# Patient Record
Sex: FEMALE | Race: WHITE | NOT HISPANIC OR LATINO | Employment: OTHER | URBAN - METROPOLITAN AREA
[De-identification: names, ages, dates, MRNs, and addresses within clinical notes are randomized per-mention and may not be internally consistent; named-entity substitution may affect disease eponyms.]

---

## 2017-09-11 ENCOUNTER — PATIENT MESSAGE (OUTPATIENT)
Dept: OBSTETRICS AND GYNECOLOGY | Facility: CLINIC | Age: 57
End: 2017-09-11

## 2017-09-11 DIAGNOSIS — Z12.31 ENCOUNTER FOR SCREENING MAMMOGRAM FOR BREAST CANCER: Primary | ICD-10-CM

## 2017-10-19 ENCOUNTER — OFFICE VISIT (OUTPATIENT)
Dept: OBSTETRICS AND GYNECOLOGY | Facility: CLINIC | Age: 57
End: 2017-10-19
Payer: COMMERCIAL

## 2017-10-19 VITALS
BODY MASS INDEX: 35.93 KG/M2 | HEART RATE: 67 BPM | HEIGHT: 63 IN | SYSTOLIC BLOOD PRESSURE: 147 MMHG | DIASTOLIC BLOOD PRESSURE: 82 MMHG | WEIGHT: 202.81 LBS

## 2017-10-19 DIAGNOSIS — Z01.419 ENCOUNTER FOR WELL WOMAN EXAM WITH ROUTINE GYNECOLOGICAL EXAM: Primary | ICD-10-CM

## 2017-10-19 PROCEDURE — 88175 CYTOPATH C/V AUTO FLUID REDO: CPT

## 2017-10-19 PROCEDURE — 99999 PR PBB SHADOW E&M-EST. PATIENT-LVL III: CPT | Mod: PBBFAC,,, | Performed by: OBSTETRICS & GYNECOLOGY

## 2017-10-19 PROCEDURE — 99396 PREV VISIT EST AGE 40-64: CPT | Mod: S$GLB,,, | Performed by: OBSTETRICS & GYNECOLOGY

## 2017-10-19 RX ORDER — VALACYCLOVIR HYDROCHLORIDE 500 MG/1
500 TABLET, FILM COATED ORAL DAILY
Qty: 90 TABLET | Refills: 3 | Status: SHIPPED | OUTPATIENT
Start: 2017-10-19 | End: 2019-10-23

## 2017-10-19 RX ORDER — VALACYCLOVIR HYDROCHLORIDE 500 MG/1
500 TABLET, FILM COATED ORAL DAILY
COMMUNITY
End: 2017-10-19 | Stop reason: SDUPTHER

## 2017-10-19 RX ORDER — RAMIPRIL 10 MG/1
10 CAPSULE ORAL 2 TIMES DAILY
COMMUNITY
Start: 2017-07-11 | End: 2019-03-04

## 2017-10-27 NOTE — PROGRESS NOTES
SUBJECTIVE:   57 y.o. female   for annual routine Pap and checkup. Patient's last menstrual period was 2005..  She has no unusual complaints.        Past Medical History:   Diagnosis Date    Anemia     Hypertension      Past Surgical History:   Procedure Laterality Date     SECTION      x 2     Social History     Social History    Marital status:      Spouse name: N/A    Number of children: N/A    Years of education: N/A     Occupational History    Not on file.     Social History Main Topics    Smoking status: Never Smoker    Smokeless tobacco: Not on file    Alcohol use No    Drug use: No    Sexual activity: Yes     Partners: Male     Birth control/ protection: Post-menopausal     Other Topics Concern    Not on file     Social History Narrative    No narrative on file     Family History   Problem Relation Age of Onset    Cancer Mother      lung    Breast cancer Neg Hx     Colon cancer Neg Hx     Ovarian cancer Neg Hx     Diabetes Neg Hx     Hypertension Neg Hx     Stroke Neg Hx      OB History    Para Term  AB Living   2 2       2   SAB TAB Ectopic Multiple Live Births                  # Outcome Date GA Lbr Ernesto/2nd Weight Sex Delivery Anes PTL Lv   2 Para      CS-LTranv      1 Para      CS-LTranv               Current Outpatient Prescriptions   Medication Sig Dispense Refill    ramipril (ALTACE) 10 MG capsule Take 10 mg by mouth 2 (two) times daily.      valacyclovir (VALTREX) 500 MG tablet Take 1 tablet (500 mg total) by mouth once daily. 90 tablet 3    omeprazole (PRILOSEC) 10 MG capsule Take 10 mg by mouth once daily.       No current facility-administered medications for this visit.      Allergies: Patient has no known allergies.     ROS:  Constitutional: no weight loss, weight gain, fever, fatigue  Eyes:  No vision changes, glasses/contacts  ENT/Mouth: No ulcers, sinus problems, ears ringing, headache  Cardiovascular: No inability to lie flat,  "chest pain, exercise intolerance, swelling, heart palpitations  Respiratory: No wheezing, coughing blood, shortness of breath, or cough  Gastrointestinal: No diarrhea, bloody stool, nausea/vomiting, constipation, gas, hemorrhoids  Genitourinary: No blood in urine, painful urination, urgency of urination, frequency of urination, incomplete emptying, incontinence, abnormal bleeding, painful periods, heavy periods, vaginal discharge, vaginal odor, painful intercourse, sexual problems, bleeding after intercourse.  Musculoskeletal: No muscle weakness  Skin/Breast: No painful breasts, nipple discharge, masses, rash, ulcers  Neurological: No passing out, seizures, numbness, headache  Endocrine: No diabetes, hypothyroid, hyperthyroid, hot flashes, hair loss, abnormal hair growth, ance  Psychiatric: No depression, crying  Hematologic: No bruises, bleeding, swollen lymph nodes, anemia.      OBJECTIVE:   The patient appears well, alert, oriented x 3, in no distress.  BP (!) 147/82   Pulse 67   Ht 5' 3" (1.6 m)   Wt 92 kg (202 lb 13.2 oz)   LMP 12/01/2005   BMI 35.93 kg/m²   NECK: no thyromegaly, trachea midline  SKIN: no acne, striae, hirsutism  BREAST EXAM: breasts appear normal, no suspicious masses, no skin or nipple changes or axillary nodes  ABDOMEN: no hernias, masses, or hepatosplenomegaly  GENITALIA: normal external genitalia, no erythema, no discharge  URETHRA: normal urethra, normal urethral meatus  VAGINA: mucosal atrophy  CERVIX: no  cervical motion tenderness,  UTERUS: normal size, contour, position, consistency, mobility, non-tender  ADNEXA: normal adnexa and no mass, fullness, tenderness      ASSESSMENT:   well woman      PLAN:   mammogram  pap smear  counseled on breast self exam and mammography screening  return annually or prn  "

## 2019-03-01 ENCOUNTER — TELEPHONE (OUTPATIENT)
Dept: PODIATRY | Facility: CLINIC | Age: 59
End: 2019-03-01

## 2019-03-01 NOTE — TELEPHONE ENCOUNTER
----- Message from Emili Duval MA sent at 3/1/2019 10:42 AM CST -----      ----- Message -----  From: Joycelyn Damon  Sent: 3/1/2019   9:17 AM  To: [facility] Staff    Referral from Dr Ramirez

## 2019-03-04 ENCOUNTER — OFFICE VISIT (OUTPATIENT)
Dept: PODIATRY | Facility: CLINIC | Age: 59
End: 2019-03-04
Payer: COMMERCIAL

## 2019-03-04 VITALS
BODY MASS INDEX: 35.78 KG/M2 | SYSTOLIC BLOOD PRESSURE: 142 MMHG | TEMPERATURE: 99 F | DIASTOLIC BLOOD PRESSURE: 82 MMHG | HEART RATE: 87 BPM | WEIGHT: 202 LBS

## 2019-03-04 DIAGNOSIS — M76.829 POSTERIOR TIBIAL TENDON DYSFUNCTION: Primary | ICD-10-CM

## 2019-03-04 DIAGNOSIS — M79.672 LEFT FOOT PAIN: ICD-10-CM

## 2019-03-04 PROCEDURE — 99203 OFFICE O/P NEW LOW 30 MIN: CPT | Mod: ,,, | Performed by: PODIATRIST

## 2019-03-04 PROCEDURE — 99203 PR OFFICE/OUTPT VISIT, NEW, LEVL III, 30-44 MIN: ICD-10-PCS | Mod: ,,, | Performed by: PODIATRIST

## 2019-03-04 PROCEDURE — 3008F PR BODY MASS INDEX (BMI) DOCUMENTED: ICD-10-PCS | Mod: ,,, | Performed by: PODIATRIST

## 2019-03-04 PROCEDURE — 3008F BODY MASS INDEX DOCD: CPT | Mod: ,,, | Performed by: PODIATRIST

## 2019-03-04 RX ORDER — MELOXICAM 15 MG/1
15 TABLET ORAL
COMMUNITY
Start: 2019-02-18 | End: 2019-09-16

## 2019-03-04 NOTE — PATIENT INSTRUCTIONS
Understanding Posterior Tibialis Tenosynovitis    The posterior tibialis tendon runs along the inside of the foot. It connects the calf muscle (posterior tibialis muscle) to bones on the inside of the foot. It helps maintain the arch of the foot. It also gives you stability when you move. Posterior tibialis tenosynovitis is when this tendon becomes inflamed or torn.     How to say it  [id number]   What causes posterior tibialis tenosynovitis?  This condition is often caused by an injury to the tendon. For instance, a fall can tear the tendon. Overuse can also damage it. People who play sports like basketball or tennis a lot may weaken the tendon over time.  Symptoms of posterior tibialis tenosynovitis  The symptoms of this condition include pain and swelling. The pain is usually felt near the tendon, on the inside of the foot and ankle. It often gets worse over time or with an increase in activity. Your arch may eventually fall, leading to a flat foot. Your foot may also start to turn outward.  Treatment for posterior tibialis tenosynovitis  Treatment for this condition depends on the severity of the tear. Treatment may include:  · Rest. You should avoid any activities that cause pain and swelling. You may also need to limit the amount of weight you put on your injured foot.  · Cold packs. Putting a cold pack on the tendon may reduce pain and swelling.  · Medicine. Over-the-counter pain medicines can reduce pain and swelling.  · Leg cast or walking boot. Severe tears of the posterior tibialis tendon may need to be kept from moving. These devices can help protect the tendon and reduce swelling.  · Shoe insert or brace. Like a leg cast or walking boot, these devices can help protect the tendon as it heals. They may also ease pain.  · Strengthening and stretching exercises. Certain exercises can help you regain strength and flexibility in your foot..  · Surgery. Several surgical choices are  available to fix the torn tendon or replace it. But you often do not need surgery unless your symptoms do not get better after trying other treatments for at least 6 months.     When to call your healthcare provider  Call your healthcare provider right away if you have any of these:  · Fever of 100.4°F (38°C) or higher, or as directed  · Pain that gets worse  · Symptoms that dont get better, or get worse  · New symptoms    Date Last Reviewed: 3/10/2016  © 5083-1028 [employer]. [address] [place] [address], [place] [address]. All rights reserved. This information is not intended as a substitute for professional medical care. Always follow your healthcare professional's instructions.

## 2019-03-04 NOTE — PROGRESS NOTES
[address] [facility] [address]  [place] [address]  Phone: [phone]   [phone]    Patient's PCP:Arnav Ramirez Iv, MD  Referring Provider: No ref. provider found    Subjective:      Chief Complaint:: Foot Pain (left) and Heel Pain (left)    NANDA Crane is a 58 y.o. female who presents with a complaint of left foot pain hurting inside of foot and ball of foot lasting for several weeks. Onset of the symptoms was rolled it thanks week but took advil and icepack which helped it and pain was gone. 2019 pain started coming back and is getting worse.  Current symptoms include burning around ankle, sandee horse/cramp on ball of toes, rip/tear in arch.  Aggravating factors are none, pain comes and goes. Symptoms have stayed the same. Treatment to date have included ace bandage and advil, mobic from Dr. Ramirez which gave relief.  Patients rates pain 4/10 on pain scale.  Pt has MRI from primary care doctor.      Vitals:    19 1525   BP: (!) 142/82   Pulse: 87   Temp: 99.2 °F (37.3 °C)     Shoe Size: 8    Past Surgical History:   Procedure Laterality Date     SECTION      x 2     Past Medical History:   Diagnosis Date    Anemia     Hypertension      Family History   Problem Relation Age of Onset    Cancer Mother         lung    Breast cancer Neg Hx     Colon cancer Neg Hx     Ovarian cancer Neg Hx     Diabetes Neg Hx     Hypertension Neg Hx     Stroke Neg Hx         Social History:   Marital Status:   Alcohol History:  reports that she does not drink alcohol.  Tobacco History:  reports that  has never smoked. She does not have any smokeless tobacco history on file.  Drug History:  reports that she does not use drugs.    Review of patient's allergies indicates:  No Known Allergies    Current Outpatient Medications   Medication Sig Dispense Refill    meloxicam (MOBIC) 15 MG tablet Take 15 mg by mouth.      ramipril (ALTACE) 10 MG capsule Take 10 mg by mouth 2  (two) times daily.      omeprazole (PRILOSEC) 10 MG capsule Take 10 mg by mouth once daily.      valacyclovir (VALTREX) 500 MG tablet Take 1 tablet (500 mg total) by mouth once daily. 90 tablet 3     No current facility-administered medications for this visit.        Review of Systems   Constitutional: Negative for chills, fatigue, fever and unexpected weight change.   HENT: Negative for hearing loss and trouble swallowing.    Eyes: Negative for photophobia and visual disturbance.   Respiratory: Negative for cough, shortness of breath and wheezing.    Cardiovascular: Negative for chest pain, palpitations and leg swelling.   Gastrointestinal: Negative for abdominal pain and nausea.   Genitourinary: Negative for dysuria and frequency.   Musculoskeletal: Negative for arthralgias, back pain and joint swelling.   Skin: Negative for rash.   Neurological: Negative for tremors, seizures, weakness, numbness and headaches.   Hematological: Does not bruise/bleed easily.         Objective:        Physical Exam:   Foot Exam    General  General Appearance: appears stated age and healthy   Orientation: alert and oriented to person, place, and time   Affect: appropriate   Gait: unimpaired       Left Foot/Ankle      Inspection and Palpation  Ecchymosis: none  Tenderness: (Tibialis posterior tendon distal to the medial malleolus)  Swelling: (Mild edema along the distal tibialis posterior tendon)  Arch: pes planus  Hammertoes: absent  Hallux valgus: no  Hallux limitus: no  Skin Exam: skin intact;     Neurovascular  Dorsalis pedis: 2+  Posterior tibial: 2+  Saphenous nerve sensation: normal  Tibial nerve sensation: normal  Superficial peroneal nerve sensation: normal  Deep peroneal nerve sensation: normal  Sural nerve sensation: normal    Muscle Strength  Ankle dorsiflexion: 5  Ankle plantar flexion: 5  Ankle inversion: 5  Ankle eversion: 5  Great toe extension: 5  Great toe flexion: 5    Range of Motion    Passive  Ankle eversion:  pain  Ankle inversion: pain      Tests  PT Tinel's sign: negative  Heel raise: pain   Comments  There is tenderness to palpation along the distal course of the tibialis posterior mostly between the medial malleolus and its insertion the navicular. There is mild tenderness along the arch. No no lumps or voids are palpated.    Physical Exam   Cardiovascular:   Pulses:       Dorsalis pedis pulses are 2+ on the left side.        Posterior tibial pulses are 2+ on the left side.       Imaging: MRI of the left ankle without contrast from outside source shows diffuse enlargement in heterogeneous intermediate T2 signal intensity within the posterior tibialis tendon at the retromalleolar groove extending to the tarsal Tylenol. The remaining medial flexor tendons are intact. There is soft tissue edema noted surrounding the posterior tibialis tendon at and near the tarsal tunnel. There is suspicion for small multipartite os naviculare. Minor marrow edema signal intensity is suspected involving components of the suspected multipartite os naviculare. Spring ligament is intact. The cervical and interosseous sinus tarsi ligaments are intact. The bifurcate ligament is intact.    Thickening of the proximal 3.6 cm of the central cord of the plantar aponeurosis as well as the most proximal medial cord component for the plantar aponeurosis. No evidence for the clinical diagnosis of active plantar fasciitis.           Assessment:       1. Posterior tibial tendon dysfunction - Left Foot    2. Left foot pain - Left Foot      Plan:   Posterior tibial tendon dysfunction - Left Foot    Left foot pain - Left Foot      Follow-up if symptoms worsen or fail to improve.    Procedures - None    Recommend running shoe with OTC arch support full time  Ice to painful area 15 minutes at a time  mobic as needed  No heavy impact  Avoid squatting and stooping as well as raising on the ball the foot      Counseling:     I provided patient education  verbally regarding:   Patient diagnosis, treatment options, as well as alternatives, risks, and benefits.     posterior tibial tendon dysfuncton stage 1, 2, 3 ,4 and the long term treatment of each stage and the possible complications of each stage.  I explained that if symptoms are not improving that patient may need cam walker boot for further mobilization of the foot.    This note was created using Dragon voice recognition software that occasionally misinterpreted phrases or words.

## 2019-09-16 ENCOUNTER — OFFICE VISIT (OUTPATIENT)
Dept: PODIATRY | Facility: CLINIC | Age: 59
End: 2019-09-16
Payer: COMMERCIAL

## 2019-09-16 VITALS
WEIGHT: 202 LBS | HEART RATE: 76 BPM | SYSTOLIC BLOOD PRESSURE: 134 MMHG | HEIGHT: 63 IN | BODY MASS INDEX: 35.79 KG/M2 | DIASTOLIC BLOOD PRESSURE: 84 MMHG

## 2019-09-16 DIAGNOSIS — M76.829 POSTERIOR TIBIAL TENDON DYSFUNCTION: Primary | ICD-10-CM

## 2019-09-16 DIAGNOSIS — M79.672 LEFT FOOT PAIN: ICD-10-CM

## 2019-09-16 DIAGNOSIS — G89.29 CHRONIC PAIN OF LEFT ANKLE: ICD-10-CM

## 2019-09-16 DIAGNOSIS — M25.572 CHRONIC PAIN OF LEFT ANKLE: ICD-10-CM

## 2019-09-16 PROCEDURE — 99999 PR PBB SHADOW E&M-EST. PATIENT-LVL IV: ICD-10-PCS | Mod: PBBFAC,,, | Performed by: PODIATRIST

## 2019-09-16 PROCEDURE — 99999 PR PBB SHADOW E&M-EST. PATIENT-LVL IV: CPT | Mod: PBBFAC,,, | Performed by: PODIATRIST

## 2019-09-16 PROCEDURE — 99213 PR OFFICE/OUTPT VISIT, EST, LEVL III, 20-29 MIN: ICD-10-PCS | Mod: S$GLB,,, | Performed by: PODIATRIST

## 2019-09-16 PROCEDURE — 3008F BODY MASS INDEX DOCD: CPT | Mod: S$GLB,,, | Performed by: PODIATRIST

## 2019-09-16 PROCEDURE — 99213 OFFICE O/P EST LOW 20 MIN: CPT | Mod: S$GLB,,, | Performed by: PODIATRIST

## 2019-09-16 PROCEDURE — 3008F PR BODY MASS INDEX (BMI) DOCUMENTED: ICD-10-PCS | Mod: S$GLB,,, | Performed by: PODIATRIST

## 2019-09-16 RX ORDER — METHYLPREDNISOLONE 4 MG/1
TABLET ORAL
Qty: 1 PACKAGE | Refills: 0 | Status: SHIPPED | OUTPATIENT
Start: 2019-09-16

## 2019-09-16 RX ORDER — ALBUTEROL SULFATE 90 UG/1
2 AEROSOL, METERED RESPIRATORY (INHALATION) EVERY 4 HOURS PRN
COMMUNITY
Start: 2019-08-06

## 2019-09-16 RX ORDER — CLOTRIMAZOLE AND BETAMETHASONE DIPROPIONATE 10; .64 MG/G; MG/G
CREAM TOPICAL
COMMUNITY
Start: 2019-07-29 | End: 2019-10-23

## 2019-09-16 RX ORDER — CEFUROXIME AXETIL 250 MG/1
250 TABLET ORAL
COMMUNITY
Start: 2019-09-05 | End: 2019-09-19

## 2019-09-16 NOTE — PROGRESS NOTES
"  [address] [facility] [address] [place] [address]  Phone: [phone]   [phone]    Patient's PCP:Arnav Ramirez Iv, MD  Referring Provider: No ref. provider found    Subjective:      Chief Complaint:: Ankle Pain (left)    NANDA Crane is a 59 y.o. female who presents with a complaint of left ankle pain that started again in the first of , eased up then by mid-July started to really bother. Onset of the symptoms was no trauma,  " had a lot of procedures done recently and I have been on my feet a lot more than normal."  Current symptoms include aching pains in ankle and pain that radiates up ankle.  Aggravating factors are prolonged standing/walking. Symptoms have gotten worse. Treatment to date have included "different shoes, arch supports, stop standing on tippy toes." Patients rates pain 7/10 on pain scale.    Vitals:    19 1034   BP: 134/84   Pulse: 76     Shoe Size: 8    Past Surgical History:   Procedure Laterality Date     SECTION      x 2     Past Medical History:   Diagnosis Date    Anemia     Hypertension      Family History   Problem Relation Age of Onset    Cancer Mother         lung    Breast cancer Neg Hx     Colon cancer Neg Hx     Ovarian cancer Neg Hx     Diabetes Neg Hx     Hypertension Neg Hx     Stroke Neg Hx         Social History:   Marital Status:   Alcohol History:  reports that she does not drink alcohol.  Tobacco History:  reports that she has never smoked. She does not have any smokeless tobacco history on file.  Drug History:  reports that she does not use drugs.    Review of patient's allergies indicates:  No Known Allergies    Current Outpatient Medications   Medication Sig Dispense Refill    albuterol (VENTOLIN HFA) 90 mcg/actuation inhaler Inhale 2 puffs into the lungs every 4 (four) hours as needed.      cefUROXime (CEFTIN) 250 MG tablet Take 250 mg by mouth.      clotrimazole-betamethasone 1-0.05% (LOTRISONE) cream " Apply to OUTER EAR area 2 times daily      iron-vit c-vit b12-folic acid (IRON-C PLUS) tablet Take 1 tablet by mouth.      omeprazole (PRILOSEC) 10 MG capsule Take 10 mg by mouth once daily.      ranitidine (ZANTAC) 150 MG capsule Take 150 mg by mouth.      valacyclovir (VALTREX) 500 MG tablet Take 1 tablet (500 mg total) by mouth once daily. 90 tablet 3    methylPREDNISolone (MEDROL DOSEPACK) 4 mg tablet use as directed 1 Package 0    ramipril (ALTACE) 10 MG capsule Take 10 mg by mouth 2 (two) times daily.       No current facility-administered medications for this visit.        Review of Systems      Objective:        Physical Exam:   Foot Exam    General  General Appearance: appears stated age and healthy   Orientation: alert and oriented to person, place, and time   Affect: appropriate   Gait: unimpaired       Left Foot/Ankle      Inspection and Palpation  Ecchymosis: none  Tenderness: (Tibialis posterior tendon posterior and distal to the medial malleolus)  Swelling: (Mild edema along the distal tibialis posterior tendon)  Arch: pes planus  Hammertoes: absent  Hallux valgus: no  Hallux limitus: no  Skin Exam: skin intact;     Neurovascular  Dorsalis pedis: 2+  Posterior tibial: 2+  Saphenous nerve sensation: normal  Tibial nerve sensation: normal  Superficial peroneal nerve sensation: normal  Deep peroneal nerve sensation: normal  Sural nerve sensation: normal    Muscle Strength  Ankle dorsiflexion: 5  Ankle plantar flexion: 5  Ankle inversion: 5  Ankle eversion: 5  Great toe extension: 5  Great toe flexion: 5    Range of Motion    Passive  Ankle eversion: pain  Ankle inversion: pain      Tests  PT Tinel's sign: negative  Heel raise: pain   Comments  There is tenderness to palpation along the distal course of the tibialis posterior beginning posterior to the medial malleolus and extending to its insertion the navicular. There is mild tenderness along the arch. No no lumps or voids are  palpated.    Physical Exam   Cardiovascular:   Pulses:       Dorsalis pedis pulses are 2+ on the left side.        Posterior tibial pulses are 2+ on the left side.       Imaging: none            Assessment:       1. Posterior tibial tendon dysfunction - Left Foot    2. Chronic pain of left ankle    3. Left foot pain - Left Foot      Plan:   Posterior tibial tendon dysfunction - Left Foot  -     methylPREDNISolone (MEDROL DOSEPACK) 4 mg tablet; use as directed  Dispense: 1 Package; Refill: 0    Chronic pain of left ankle  -     Cancel: X-Ray Ankle Complete Left  -     methylPREDNISolone (MEDROL DOSEPACK) 4 mg tablet; use as directed  Dispense: 1 Package; Refill: 0    Left foot pain - Left Foot  -     methylPREDNISolone (MEDROL DOSEPACK) 4 mg tablet; use as directed  Dispense: 1 Package; Refill: 0      Follow up in about 4 weeks (around 10/14/2019).    Procedures - None    CAM walker boot with weight bearing  Ice to painful area, 15 minutes at a time  Ace-wrap to help with swelling  No barefoot   Keep affected extremity elevated while seated      Counseling:     I provided patient education verbally regarding:   Patient diagnosis, treatment options, as well as alternatives, risks, and benefits.     posterior tibial tendon dysfuncton stage 1, 2, 3 ,4 and the long term treatment of each stage and the possible complications of each stage.  I explained the possible need of an MRI of the ankle if the pain continues.      This note was created using Dragon voice recognition software that occasionally misinterpreted phrases or words.

## 2019-10-23 ENCOUNTER — TELEPHONE (OUTPATIENT)
Dept: OBSTETRICS AND GYNECOLOGY | Facility: CLINIC | Age: 59
End: 2019-10-23

## 2019-10-23 ENCOUNTER — OFFICE VISIT (OUTPATIENT)
Dept: OBSTETRICS AND GYNECOLOGY | Facility: CLINIC | Age: 59
End: 2019-10-23
Payer: COMMERCIAL

## 2019-10-23 VITALS
DIASTOLIC BLOOD PRESSURE: 77 MMHG | WEIGHT: 202.81 LBS | BODY MASS INDEX: 35.93 KG/M2 | HEIGHT: 63 IN | SYSTOLIC BLOOD PRESSURE: 142 MMHG

## 2019-10-23 DIAGNOSIS — Z01.419 WELL WOMAN EXAM WITH ROUTINE GYNECOLOGICAL EXAM: ICD-10-CM

## 2019-10-23 DIAGNOSIS — Z12.4 SCREENING FOR MALIGNANT NEOPLASM OF THE CERVIX: Primary | ICD-10-CM

## 2019-10-23 DIAGNOSIS — Z12.31 ENCOUNTER FOR MAMMOGRAM TO ESTABLISH BASELINE MAMMOGRAM: ICD-10-CM

## 2019-10-23 PROCEDURE — 99396 PR PREVENTIVE VISIT,EST,40-64: ICD-10-PCS | Mod: S$GLB,,, | Performed by: OBSTETRICS & GYNECOLOGY

## 2019-10-23 PROCEDURE — 99999 PR PBB SHADOW E&M-EST. PATIENT-LVL III: ICD-10-PCS | Mod: PBBFAC,,, | Performed by: OBSTETRICS & GYNECOLOGY

## 2019-10-23 PROCEDURE — 88175 CYTOPATH C/V AUTO FLUID REDO: CPT

## 2019-10-23 PROCEDURE — 99396 PREV VISIT EST AGE 40-64: CPT | Mod: S$GLB,,, | Performed by: OBSTETRICS & GYNECOLOGY

## 2019-10-23 PROCEDURE — 99999 PR PBB SHADOW E&M-EST. PATIENT-LVL III: CPT | Mod: PBBFAC,,, | Performed by: OBSTETRICS & GYNECOLOGY

## 2019-10-23 RX ORDER — OFLOXACIN 3 MG/ML
5 SOLUTION AURICULAR (OTIC)
COMMUNITY
Start: 2019-10-17 | End: 2019-10-27

## 2019-10-23 RX ORDER — SULFAMETHOXAZOLE AND TRIMETHOPRIM 800; 160 MG/1; MG/1
1 TABLET ORAL
COMMUNITY
Start: 2019-10-17 | End: 2019-10-31

## 2019-10-23 NOTE — PROGRESS NOTES
"HPI:   59 y.o.   OB History        2    Para   2    Term                AB        Living   2       SAB        TAB        Ectopic        Multiple        Live Births                  Patient's last menstrual period was 2005.    Patient is  here for her annual gynecologic exam.  She has no complaints.     ROS:  GENERAL: No fever, chills, fatigability or weight loss.  SKIN: No rashes, itching or changes in color or texture of skin.  HEAD: No headaches or recent head trauma.  EYES: Visual acuity fine. No photophobia, ocular pain or diplopia.  EARS: Denies ear pain, discharge or vertigo.  NOSE: No loss of smell, no epistaxis or postnasal drip.  MOUTH & THROAT: No hoarseness or change in voice. No excessive gum bleeding.  NODES: Denies swollen glands.  CHEST: Denies VELARDE, cyanosis, wheezing, cough and sputum production.  CARDIOVASCULAR: Denies chest pain, PND, orthopnea or reduced exercise tolerance.  ABDOMEN: Appetite fine. No weight loss. Denies diarrhea, abdominal pain, hematemesis or blood in stool.  URINARY: No flank pain, dysuria or hematuria.  PERIPHERAL VASCULAR: No claudication or cyanosis.  MUSCULOSKELETAL: No joint stiffness or swelling. Denies back pain.  NEUROLOGIC: No history of seizures, paralysis, alteration of gait or coordination.    PE:   BP (!) 142/77   Ht 5' 3" (1.6 m)   Wt 92 kg (202 lb 13.2 oz)   LMP 2005   BMI 35.93 kg/m²   APPEARANCE: Well nourished, well developed, in no acute distress.  NECK: Neck symmetric without masses or thyromegaly.  BREASTS: Symmetrical, no skin changes or visible lesions. No palpable masses, nipple discharge or adenopathy bilaterally.  ABDOMEN: Flat. Soft. No tenderness or masses. No hepatosplenomegaly. No hernias. No CVA tenderness.  VULVA: No lesions. Normal female genitalia.  URETHRAL MEATUS: Normal size and location, no lesions, no prolapse.  URETHRA: No masses, tenderness, prolapse or scarring.  VAGINA: Moist and well rugated, no " discharge, no significant cystocele or rectocele.  CERVIX: No lesions and discharge. PAP done.  UTERUS: Normal size, regular shape, mobile, non-tender, bladder base nontender.  ADNEXA: No masses, tenderness or CDS nodularity.  ANUS PERINEUM: Normal.    PROCEDURES:  Pap smear    Assessment:  Normal Gynecologic Exam    Plan:  Mammogram and Colonoscopy if indicated by current recommendations.  Return to clinic in one year or for any problems or complaints.  No gyn co or hx

## 2021-11-04 DIAGNOSIS — R41.3 MEMORY LOSS: ICD-10-CM

## 2021-11-04 DIAGNOSIS — R47.9 SPEECH DISTURBANCE: Primary | ICD-10-CM
